# Patient Record
Sex: MALE | Race: OTHER | NOT HISPANIC OR LATINO
[De-identification: names, ages, dates, MRNs, and addresses within clinical notes are randomized per-mention and may not be internally consistent; named-entity substitution may affect disease eponyms.]

---

## 2021-08-20 PROBLEM — Z00.00 ENCOUNTER FOR PREVENTIVE HEALTH EXAMINATION: Status: ACTIVE | Noted: 2021-08-20

## 2021-08-27 ENCOUNTER — APPOINTMENT (OUTPATIENT)
Dept: OTOLARYNGOLOGY | Facility: CLINIC | Age: 61
End: 2021-08-27
Payer: COMMERCIAL

## 2021-08-27 ENCOUNTER — RESULT REVIEW (OUTPATIENT)
Age: 61
End: 2021-08-27

## 2021-08-27 VITALS
HEIGHT: 72 IN | SYSTOLIC BLOOD PRESSURE: 132 MMHG | BODY MASS INDEX: 22.35 KG/M2 | TEMPERATURE: 97.1 F | HEART RATE: 82 BPM | WEIGHT: 165 LBS | DIASTOLIC BLOOD PRESSURE: 74 MMHG

## 2021-08-27 DIAGNOSIS — Z86.39 PERSONAL HISTORY OF OTHER ENDOCRINE, NUTRITIONAL AND METABOLIC DISEASE: ICD-10-CM

## 2021-08-27 PROCEDURE — 92557 COMPREHENSIVE HEARING TEST: CPT

## 2021-08-27 PROCEDURE — 99204 OFFICE O/P NEW MOD 45 MIN: CPT | Mod: 25

## 2021-08-27 PROCEDURE — 31575 DIAGNOSTIC LARYNGOSCOPY: CPT

## 2021-08-27 PROCEDURE — 92550 TYMPANOMETRY & REFLEX THRESH: CPT

## 2021-08-27 RX ORDER — ALFUZOSIN HYDROCHLORIDE 10 MG/1
10 TABLET, EXTENDED RELEASE ORAL
Qty: 90 | Refills: 0 | Status: COMPLETED | COMMUNITY
Start: 2021-03-05

## 2021-08-27 NOTE — ASSESSMENT
[FreeTextEntry1] : Mr. MARTINEZ was evaluated for the following issues today:\par \par 1.) nasal congestion due to inferior turbinate hypertrophy mainly.  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  Has Mallampati 3 airway, with low floppy SP and relatively posterior tongue position due to small/narrow mandible\par --> sleep study (he prefers home), referral to SDI\par Options for treatment of CELINA and snoring discussed briefly - CPAP, mandibular advancement devices (MAD), sleep position.  Pharyngeal surgery not optimal based on his anatomy.\par \par 3.) Dysphagia pharyngoesophageal to solids only for about 1 year.  No throat pain or unintentional wt loss.\par R/o CP bar or other obstruction.  No evidence of reflux by hx or exam.\par --> modified barium swallow (pt instructed to take some solids that he has trouble swallowing with him to the test)\par \par 4.) HL and tinnitus bilateral.  Cerumen impactions removed bilateral \par Audiogram with LEFT within normal limits and RIGHT high frequency mild SNHL.\par Asymmetry of 5-15 dB, worse on right right side.  No reason evident for the asymmetry.\par --> retest audio in 3 months.  If same, will order MRI brain/IAC to r/o IAC lesion.\par \par Return in 1 month\par

## 2021-08-27 NOTE — DATA REVIEWED
[de-identified] : \par AUDIOGRAM (8/27/2021)\par RIGHT:  Hearing within normal limits through 2K Hz, sloping to mild SNHL\par LEFT:   Hearing within normal limits\par Asymmetry 1-8K Hz (10-20 dB), worse on right side.\par Tympanograms  A  bilateral  \par Word recognition 90% on right, 100% on  left\par \par

## 2021-08-27 NOTE — REVIEW OF SYSTEMS
[Patient Intake Form Reviewed] : Patient intake form was reviewed [As Noted in HPI] : as noted in HPI [Feeling Tired] : feeling tired [Negative] : Heme/Lymph

## 2021-08-27 NOTE — CONSULT LETTER
[Dear  ___] : Dear  [unfilled], [( Thank you for referring [unfilled] for consultation for _____ )] : Thank you for referring [unfilled] for consultation for [unfilled] [Please see my note below.] : Please see my note below. [Consult Closing:] : Thank you very much for allowing me to participate in the care of this patient.  If you have any questions, please do not hesitate to contact me. [Sincerely,] : Sincerely, [___] : [unfilled] [FreeTextEntry2] : Aureliano Castro MD\par [address]\[place] [address] [FreeTextEntry3] : \par Jaja Melo MD \par Otolaryngology, Head and Neck Surgery \par \par

## 2021-08-27 NOTE — HISTORY OF PRESENT ILLNESS
[de-identified] : Mr. MARTINEZ is a 61 year old man who was referred by Dr. Castro  for nasal blockage.\par \par 1.) At least 1 year of nasal congestion/blockage while awake but worse when lying down.  Congestion alternates sides.  When lying down, ipsilateral side of nose becomes completely blocked.\par No postnasal drip, rhinorrhea or facial pressure/pain.  Occasional temporal headaches.\par No hx nasal trauma or inhalant allergy.  \par No treatment for these sx.\par Does not feel rested after even 8 hrs sleep.  Snoring and witnessed apneas, plus nighttime awakenings, for several years.\par \par 2.) Trouble swallowing solids for about 1 year -- feels bolus gets stuck in lower throat and sometimes feels like choking; bolus clears with repeated drinking water.  \par No unintentional wt loss, throat pain, pain with swallowing, voice change, difficulty breathing.\par Never a smoker.  No alcohol.\par \par 3.) Hearing is gradually decreasing over past 1-2 years.  Wife complains that he doesn’t hear her.\par Occasional tinnitus nonpulsatile, R>L.\par No vertigo, ear pain, ear discharge.  No hx head injury, loud noise exposure or recurrent ear infections. \par Works as an .\par \par

## 2021-08-27 NOTE — PROCEDURE
[FreeTextEntry6] : \par \par Indication:  nasal congestion\par -Verbal consent was obtained from patient prior to exam. \par - Lewis-Synephrine spray applied to nose bilaterally.\par Nasal endoscopy was performed with flexible  scope.\par Findings: \par -- Inferior turbinates enlarged, L>R; slightly smaller after decongestant.  Inferior meatus clear bilateral.\par -- Septum was very mildly deviated to the right anterior \par -- No polyps either side nose\par -- Mucus clear bilateral\par -- Middle and superior turbinates normal bilateral\par -- Middle meatus clear bilateral.  SER clear bilateral.\par -- Nasopharynx without mass or exudate\par -- Adenoids were small\par -- Eustachian orifices were clear bilateral\par \par The patient tolerated the procedure well.\par   [de-identified] : \par Indication:  CELINA\par -Verbal consent was obtained from patient prior to procedure.\par -Lewis-Synephrine  spray applied to the nasal cavities.\par Flexible laryngoscopy was performed via left  nostril and revealed the following:\par   -- Nasopharynx had no mass or exudate.  Somewhat collapsing retropalatal space.\par   -- Base of tongue was symmetric and not enlarged.\par   -- Vallecula was clear\par   -- Epiglottis, both aryepiglottic folds and both false vocal folds were normal\par   -- Arytenoids both without edema and erythema \par   -- True vocal folds were fully mobile and without lesions. \par   -- Post cricoid area was clear.\par   -- Interarytenoid edema was absent \par   -- No lesions in laryngopharynx\par   -- Unable to seal for modified Henry maneuver\par  \par \par The patient tolerated the procedure well.\par

## 2021-08-27 NOTE — PHYSICAL EXAM
[Nasal Endoscopy Performed] : nasal endoscopy was performed, see procedure section for findings [] : septum deviated to the right [Midline] : trachea located in midline position [Normal] : no rashes [FreeTextEntry1] : No hoarseness.  [de-identified] : Bilateral cerumen impactions were removed with curette. [de-identified] : inferior turbinate hypertrophy L>R [de-identified] : Mallampati 3 airway [de-identified] : Very low soft palate position.  Narrow/small mandible. [de-identified] : Carotid pulses 2+ bilateral.

## 2021-10-06 ENCOUNTER — APPOINTMENT (OUTPATIENT)
Dept: RADIOLOGY | Facility: HOSPITAL | Age: 61
End: 2021-10-06
Payer: COMMERCIAL

## 2021-10-06 ENCOUNTER — OUTPATIENT (OUTPATIENT)
Dept: OUTPATIENT SERVICES | Facility: HOSPITAL | Age: 61
LOS: 1 days | End: 2021-10-06
Payer: COMMERCIAL

## 2021-10-06 PROCEDURE — 74230 X-RAY XM SWLNG FUNCJ C+: CPT | Mod: 26

## 2021-10-06 PROCEDURE — 92611 MOTION FLUOROSCOPY/SWALLOW: CPT | Mod: GN

## 2021-10-06 PROCEDURE — 74230 X-RAY XM SWLNG FUNCJ C+: CPT

## 2021-11-05 ENCOUNTER — APPOINTMENT (OUTPATIENT)
Dept: OTOLARYNGOLOGY | Facility: CLINIC | Age: 61
End: 2021-11-05

## 2021-11-29 ENCOUNTER — APPOINTMENT (OUTPATIENT)
Dept: SLEEP CENTER | Facility: HOME HEALTH | Age: 61
End: 2021-11-29
Payer: COMMERCIAL

## 2021-11-29 ENCOUNTER — OUTPATIENT (OUTPATIENT)
Dept: OUTPATIENT SERVICES | Facility: HOSPITAL | Age: 61
LOS: 1 days | End: 2021-11-29
Payer: COMMERCIAL

## 2021-11-29 PROCEDURE — 95800 SLP STDY UNATTENDED: CPT

## 2021-11-29 PROCEDURE — 95800 SLP STDY UNATTENDED: CPT | Mod: 26

## 2021-12-01 DIAGNOSIS — G47.33 OBSTRUCTIVE SLEEP APNEA (ADULT) (PEDIATRIC): ICD-10-CM

## 2022-01-13 ENCOUNTER — APPOINTMENT (OUTPATIENT)
Dept: OTOLARYNGOLOGY | Facility: CLINIC | Age: 62
End: 2022-01-13
Payer: COMMERCIAL

## 2022-01-13 DIAGNOSIS — J34.2 DEVIATED NASAL SEPTUM: ICD-10-CM

## 2022-01-13 PROCEDURE — 99214 OFFICE O/P EST MOD 30 MIN: CPT | Mod: 95

## 2022-01-13 NOTE — PHYSICAL EXAM
[Normal] : no masses and lesions seen, face is symmetric [FreeTextEntry1] : No hoarseness.  [de-identified] : No visible neck masses or asymmetry.

## 2022-01-13 NOTE — CONSULT LETTER
[Dear  ___] : Dear  [unfilled], [Please see my note below.] : Please see my note below. [Consult Closing:] : Thank you very much for allowing me to participate in the care of this patient.  If you have any questions, please do not hesitate to contact me. [Sincerely,] : Sincerely, [Courtesy Letter:] : I had the pleasure of seeing your patient, [unfilled], in my office today. [___] : [unfilled] [FreeTextEntry2] : Aureliano Castro MD\par [address]\[place] [address] [FreeTextEntry3] : \par Jaja Melo MD \par Otolaryngology, Head and Neck Surgery \par \par

## 2022-01-13 NOTE — DATA REVIEWED
[de-identified] : \par AUDIOGRAM (8/27/2021)\par RIGHT:  Hearing within normal limits through 2K Hz, sloping to mild SNHL\par LEFT:   Hearing within normal limits\par Asymmetry 1-8K Hz (10-20 dB), worse on right side.\par Tympanograms  A  bilateral  \par Word recognition 90% on right, 100% on  left\par \par

## 2022-01-13 NOTE — HISTORY OF PRESENT ILLNESS
[de-identified] : TELEHEALTH VISIT\par  Visit initiated at patient request. This audio / visual (using en[employer]) visit is occurring during the  state of emergency due to COVID-19. Governmental regulation is restricting travel, in-person contact, recommend use of remote activities and telemedicine whenever possible. I discussed with patient the limitations of telemedicine encounters, including risks associated with the technology platform, technical difficulties, data security, and a limited physical exam. There is also a limitation in performing diagnostic procedures and patient may need further testing and workup to arrive at a diagnosis and treatment plan. We discussed this will be billed as a visit. \par Mr. MARTINEZ understood and elected to proceed at 10 AM on Jan 13, 2022 \par Patient location: Office  in [place], [place].  Patient was the only participant.\par Physician location:  Office of Dr. Melo at [address] [place] [address] in [place], [place]\par ----------------------------------------------------------------------------------------\par ----------------------------------------------------------------------------------------\par  Mr. MARTINEZ is a 61 year old man who was seen in f/up for several ENT issues.\par \par 1.) Nasal breathing is slightly improved with fluticasone nasal spray but doesn’t take every day since started end of Aug 2021.  No nose bleed or dryness.\par Hx of about 1 year of nasal congestion/blockage (alternates sides) while awake but worse when lying down (ipsilateral side of nose becomes completely blocked).\par No postnasal drip, rhinorrhea or facial pressure/pain.  Occasional temporal headaches.\par No hx nasal trauma or inhalant allergy.   No treatment for these sx prior to steroid spray.\par \par 2.) Still does not feel rested after sleep (8 hrs).  Still reports that wife observes snoring, pause, apneas and nighttime awakenings for past few years.\par Home sleep study (11/29/2021) did not show sleep-disordered breathing.  RDI 10.9, lowest O2 sat 94%. Attended PSG was recommended if suspicion for CELINA is high.\par \par 3.) He had modified barium swallow to evaluate dysphagia.  \par Trouble swallowing solids for 1 year -- still feels bolus gets stuck in lower throat and sometimes feels like choking; bolus clears with repeated drinking water.  No unintentional wt loss, throat pain, pain with swallowing, voice change, difficulty breathing.  Never a smoker.  No alcohol.\par \par MODIFIED BARIUM SWALLOW (10/06/2021) at [facility] [place]:\par - ORAL phase:   within normal limits \par - PHARYNGEAL phase:  Swallow trigger timely.  Epiglottic and tongue base movements normal.  Mildly reduced hyolaryngeal excursion.  Deep laryngeal penetration x 1 during swallow as result of incompleta airway closure.  Penetrated material deepened after swallow, resulting in trace aspiration.  Aspirate was cleared w/ volitional cough.  Pharyngeal stripping wave present but incomplete, resulting in diffuse residue within vallecula, increased to trace-mild amount with purees.  UES opening complete, without obstruction of flow.\par Barium tablet passed through pharynx and esophagus.\par - ESOPHAGEAL phase:  Scan in A-P position revealed complete esophageal clearance\par IMPRESSION:  Mildly reduced pharyngeal swallow efficiency (puree > thins).  Mild residue to which he may be hypersensate or the residue in vallecula accumulates over the course of a meal if liquid washes are not provided.  Considered low risk for pulmonary complications related to the one incidence of aspiration of liquids and subsequent clearance with cough.\par RECOMMENDATIONS:  No change in diet regular with thins.  Take small bites and sips in slow manner.  Alternate every few bites of food with sips of liquid.  Remain upright during and for 30 minutes after meal.\par \par 4.) He will need repeat audiogram for mild ASHNL (worse on right side)\par Hearing is gradually decreasing over past 1-2 years.  Wife complains that he doesn’t hear her.\par Occasional tinnitus nonpulsatile, R>L.\par No vertigo, ear pain, ear discharge.  No hx head injury, loud noise exposure or recurrent ear infections. \par Works as an .\par \par

## 2022-01-13 NOTE — ASSESSMENT
[FreeTextEntry1] : Mr. MARTINEZ was evaluated for the following issues today:\par \par 1.) nasal congestion due to inferior turbinate hypertrophy bilateral, with no hx of allergy or sinusitis. On initial exam, there was minimal deviated nasal septum.  He has had mild improvement in breathing but does not use the steroid spray daily\par --> fluticasone nasal spray daily\par If no change in sx after 4 weeks, he would be good candidate for inferior turbinate reduction.  I reviewed the procedure with him for submucosal ablation, and he is interested.  Will reassess in next in-office visit.\par \par 2.) CELINA likely per history, although in-home study was negative for CELINA.  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  \par On modified barium swallow, no CP bar or other obstruction noted.  He had mildly reduced pharyngeal efficiency that should be addressed successfully with small bites, slower eating and alternating bites with liquid sips.\par \par 4.) HL and tinnitus bilateral. Audiogram (8/2021) with LEFT within normal limits and RIGHT high frequency mild SNHL.  Asymmetry of 5-15 dB, worse on right right side.  No reason evident for the asymmetry.\par --> retest audio at next in-office visit.\par \par Return after attended sleep study.\par

## 2023-02-01 ENCOUNTER — APPOINTMENT (OUTPATIENT)
Dept: OTOLARYNGOLOGY | Facility: CLINIC | Age: 63
End: 2023-02-01
Payer: COMMERCIAL

## 2023-02-01 VITALS
WEIGHT: 174.8 LBS | TEMPERATURE: 96.2 F | HEART RATE: 66 BPM | SYSTOLIC BLOOD PRESSURE: 125 MMHG | HEIGHT: 72 IN | BODY MASS INDEX: 23.68 KG/M2 | DIASTOLIC BLOOD PRESSURE: 77 MMHG

## 2023-02-01 DIAGNOSIS — K11.5 SIALOLITHIASIS: ICD-10-CM

## 2023-02-01 PROCEDURE — 92557 COMPREHENSIVE HEARING TEST: CPT

## 2023-02-01 PROCEDURE — 99214 OFFICE O/P EST MOD 30 MIN: CPT

## 2023-02-01 PROCEDURE — 92550 TYMPANOMETRY & REFLEX THRESH: CPT | Mod: 52

## 2023-06-27 NOTE — HISTORY OF PRESENT ILLNESS
[de-identified] : Mr. MARTINEZ presents for follow up.\par \par 1.) nasal congestion due to inferior turbinate hypertrophy bilateral is not improved. The congestions switches sides, especially at night.\par He has no improvement with fluticasone but has not tried it consistently for weeks. \par 2.) CELINA - He did not do the in lab sleep study yet. He will wait until the summer.\par Home sleep study 2011 with no significant sleep disordered breathing.\par 3.) Dysphagia pharyngoesophageal to solids for about 1 year. Swallowing is improved with taking small bites, slower eating and alternating bites with liquid sips. \par About 1 month ago when he swallowed food, he felt swelling behind his left jaw; resolved after less than 1 hour.\par 4.) HL and tinnitus bilateral. \par \par VISIT 01/13/2022\par  Mr. MARTINEZ is a 61 year old man who was seen in f/up for several ENT issues.\par 1.) Nasal breathing is slightly improved with fluticasone nasal spray but doesn’t take every day since started end of Aug 2021. No nose bleed or dryness.\par Hx of about 1 year of nasal congestion/blockage (alternates sides) while awake but worse when lying down (ipsilateral side of nose becomes completely blocked).\par No postnasal drip, rhinorrhea or facial pressure/pain. Occasional temporal headaches.\par No hx nasal trauma or inhalant allergy. No treatment for these sx prior to steroid spray.\par \par 2.) Still does not feel rested after sleep (8 hrs). Still reports that wife observes snoring, pause, apneas and nighttime awakenings for past few years.\par Home sleep study (11/29/2021) did not show sleep-disordered breathing. RDI 10.9, lowest O2 sat 94%. Attended PSG was recommended if suspicion for CELINA is high.\par \par 3.) He had modified barium swallow to evaluate dysphagia. \par Trouble swallowing solids for 1 year -- still feels bolus gets stuck in lower throat and sometimes feels like choking; bolus clears with repeated drinking water. No unintentional wt loss, throat pain, pain with swallowing, voice change, difficulty breathing. Never a smoker. No alcohol.\par \par MODIFIED BARIUM SWALLOW (10/06/2021) at [facility]:\par - ORAL phase: within normal limits \par - PHARYNGEAL phase: Swallow trigger timely. Epiglottic and tongue base movements normal. Mildly reduced hyolaryngeal excursion. Deep laryngeal penetration x 1 during swallow as result of incompleta airway closure. Penetrated material deepened after swallow, resulting in trace aspiration. Aspirate was cleared w/ volitional cough. Pharyngeal stripping wave present but incomplete, resulting in diffuse residue within vallecula, increased to trace-mild amount with purees. UES opening complete, without obstruction of flow.\par Barium tablet passed through pharynx and esophagus.\par - ESOPHAGEAL phase: Scan in A-P position revealed complete esophageal clearance\par IMPRESSION: Mildly reduced pharyngeal swallow efficiency (puree > thins). Mild residue to which he may be hypersensate or the residue in vallecula accumulates over the course of a meal if liquid washes are not provided. Considered low risk for pulmonary complications related to the one incidence of aspiration of liquids and subsequent clearance with cough.\par RECOMMENDATIONS: No change in diet regular with thins. Take small bites and sips in slow manner. Alternate every few bites of food with sips of liquid. Remain upright during and for 30 minutes after meal.\par \par 4.) He will need repeat audiogram for mild ASHNL (worse on right side)\par Hearing is gradually decreasing over past 1-2 years. Wife complains that he doesn’t hear her.\par Occasional tinnitus nonpulsatile, R>L.\par No vertigo, ear pain, ear discharge. No hx head injury, loud noise exposure or recurrent ear infections. \par Works as an .\par

## 2023-06-27 NOTE — DATA REVIEWED
[de-identified] : \par AUDIOGRAM (8/27/2021)\par RIGHT:  Hearing within normal limits through 2K Hz, sloping to mild SNHL\par LEFT:   Hearing within normal limits\par Asymmetry 1-8K Hz (10-20 dB), worse on right side.\par Tympanograms  A  bilateral  \par Word recognition 90% on right, 100% on  left\par \par

## 2023-06-27 NOTE — ASSESSMENT
[FreeTextEntry1] : Mr. MARTINEZ was evaluated for the following issues today: \par \par 1.) nasal congestion due to inferior turbinate hypertrophy bilateral is not improved.\par --> continue fluticasone nasal spray x 3 weeks.  Take DAILY.\par If doesn’t improved, consider inferior turbinate reduction and maybe septoplasty\par \par 2.) CELINA likely per history, although in-home study was negative for CELINA in 2011. 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  Stone may pass.  NO sx now.\par --> increase hydration\par \par 6.) Dysphagia resolved with change in eating habits\par \par Return after tax season

## 2023-06-27 NOTE — PHYSICAL EXAM
[Normal] : mucosa is normal [Midline] : trachea located in midline position [de-identified] : bilateral inferior turbinate hypertrophy  [FreeTextEntry1] : No hoarseness.  [de-identified] : Left submandibular gland slightly enlarged, NT. [de-identified] : Thyroid gland normal size, no palpable nodules.  [de-identified] : bilateral inferior turbinate hypertrophy  [de-identified] : Palpable small stone on midline floor of mouth  [de-identified] : See NECK.  Right submandibular and bilateral parotid glands were normal.

## 2023-07-24 ENCOUNTER — NON-APPOINTMENT (OUTPATIENT)
Age: 63
End: 2023-07-24

## 2023-09-20 ENCOUNTER — APPOINTMENT (OUTPATIENT)
Dept: OTOLARYNGOLOGY | Facility: CLINIC | Age: 63
End: 2023-09-20
Payer: COMMERCIAL

## 2023-09-20 VITALS
SYSTOLIC BLOOD PRESSURE: 134 MMHG | HEIGHT: 72 IN | TEMPERATURE: 97.6 F | BODY MASS INDEX: 23.16 KG/M2 | WEIGHT: 171 LBS | DIASTOLIC BLOOD PRESSURE: 79 MMHG | HEART RATE: 65 BPM

## 2023-09-20 DIAGNOSIS — H93.13 TINNITUS, BILATERAL: ICD-10-CM

## 2023-09-20 DIAGNOSIS — K21.9 GASTRO-ESOPHAGEAL REFLUX DISEASE W/OUT ESOPHAGITIS: ICD-10-CM

## 2023-09-20 DIAGNOSIS — J34.3 HYPERTROPHY OF NASAL TURBINATES: ICD-10-CM

## 2023-09-20 DIAGNOSIS — N40.0 BENIGN PROSTATIC HYPERPLASIA WITHOUT LOWER URINARY TRACT SYMPMS: ICD-10-CM

## 2023-09-20 PROCEDURE — 99214 OFFICE O/P EST MOD 30 MIN: CPT | Mod: 25

## 2023-09-20 PROCEDURE — 31575 DIAGNOSTIC LARYNGOSCOPY: CPT

## 2023-09-20 RX ORDER — TAMSULOSIN HYDROCHLORIDE 0.4 MG/1
0.4 CAPSULE ORAL
Refills: 0 | Status: ACTIVE | COMMUNITY

## 2023-09-20 RX ORDER — FLUTICASONE PROPIONATE 50 UG/1
50 SPRAY, METERED NASAL DAILY
Qty: 1 | Refills: 4 | Status: COMPLETED | COMMUNITY
Start: 2021-08-27 | End: 2023-09-20

## 2023-10-01 PROBLEM — H93.13 TINNITUS, BILATERAL: Status: ACTIVE | Noted: 2023-10-01

## 2023-10-01 PROBLEM — J34.3 HYPERTROPHY OF BOTH INFERIOR NASAL TURBINATES: Status: ACTIVE | Noted: 2021-08-27

## 2023-12-21 ENCOUNTER — APPOINTMENT (OUTPATIENT)
Dept: OTOLARYNGOLOGY | Facility: CLINIC | Age: 63
End: 2023-12-21
Payer: COMMERCIAL

## 2023-12-21 ENCOUNTER — RESULT REVIEW (OUTPATIENT)
Age: 63
End: 2023-12-21

## 2023-12-21 VITALS
HEART RATE: 78 BPM | SYSTOLIC BLOOD PRESSURE: 115 MMHG | WEIGHT: 175 LBS | HEIGHT: 72 IN | DIASTOLIC BLOOD PRESSURE: 74 MMHG | BODY MASS INDEX: 23.7 KG/M2 | TEMPERATURE: 96.9 F

## 2023-12-21 DIAGNOSIS — G47.33 OBSTRUCTIVE SLEEP APNEA (ADULT) (PEDIATRIC): ICD-10-CM

## 2023-12-21 DIAGNOSIS — H93.A2 PULSATILE TINNITUS, LEFT EAR: ICD-10-CM

## 2023-12-21 DIAGNOSIS — H90.41 SENSORINEURAL HEARING LOSS, UNILATERAL, RIGHT EAR, WITH UNRESTRICTED HEARING ON THE CONTRALATERAL SIDE: ICD-10-CM

## 2023-12-21 DIAGNOSIS — R13.13 DYSPHAGIA, PHARYNGEAL PHASE: ICD-10-CM

## 2023-12-21 DIAGNOSIS — H93.232 HYPERACUSIS, LEFT EAR: ICD-10-CM

## 2023-12-21 DIAGNOSIS — R09.89 OTHER SPECIFIED SYMPTOMS AND SIGNS INVOLVING THE CIRCULATORY AND RESPIRATORY SYSTEMS: ICD-10-CM

## 2023-12-21 PROCEDURE — 99214 OFFICE O/P EST MOD 30 MIN: CPT | Mod: 25

## 2023-12-21 PROCEDURE — 31575 DIAGNOSTIC LARYNGOSCOPY: CPT

## 2023-12-21 RX ORDER — FLUTICASONE PROPIONATE 50 UG/1
50 SPRAY, METERED NASAL DAILY
Qty: 1 | Refills: 3 | Status: COMPLETED | COMMUNITY
Start: 2023-09-20 | End: 2023-12-21

## 2023-12-28 ENCOUNTER — RX RENEWAL (OUTPATIENT)
Age: 63
End: 2023-12-28

## 2024-01-24 ENCOUNTER — APPOINTMENT (OUTPATIENT)
Dept: GASTROENTEROLOGY | Facility: CLINIC | Age: 64
End: 2024-01-24
Payer: COMMERCIAL

## 2024-01-24 VITALS
WEIGHT: 171 LBS | SYSTOLIC BLOOD PRESSURE: 128 MMHG | BODY MASS INDEX: 23.16 KG/M2 | DIASTOLIC BLOOD PRESSURE: 80 MMHG | HEIGHT: 72 IN | OXYGEN SATURATION: 98 % | RESPIRATION RATE: 14 BRPM | HEART RATE: 75 BPM | TEMPERATURE: 96.4 F

## 2024-01-24 DIAGNOSIS — K21.00 GASTRO-ESOPHAGEAL REFLUX DISEASE WITH ESOPHAGITIS, WITHOUT BLEEDING: ICD-10-CM

## 2024-01-24 DIAGNOSIS — R13.14 DYSPHAGIA, PHARYNGOESOPHAGEAL PHASE: ICD-10-CM

## 2024-01-24 PROCEDURE — 99204 OFFICE O/P NEW MOD 45 MIN: CPT

## 2024-01-24 RX ORDER — FAMOTIDINE 20 MG/1
20 TABLET, FILM COATED ORAL
Qty: 30 | Refills: 5 | Status: COMPLETED | COMMUNITY
Start: 2023-09-20 | End: 2024-01-24

## 2024-01-24 NOTE — ASSESSMENT
[FreeTextEntry1] : 63M with a h/o dysphagia, frequent throat clearing, and occasional heartburn who presents for these symptoms.   #Intermittent heartburn #Dysphagia - continue omeprazole once daily - schedule for EGD at [facility] for further eval - f/u MBS with SLP next month   Heath Kerr MD Gastroenterology

## 2024-01-24 NOTE — PHYSICAL EXAM
[Alert] : alert [No Acute Distress] : no acute distress [Sclera] : the sclera and conjunctiva were normal [Hearing Threshold Finger Rub Not [place]] : hearing was normal [No Respiratory Distress] : no respiratory distress [No Acc Muscle Use] : no accessory muscle use [Respiration, Rhythm And Depth] : normal respiratory rhythm and effort [Heart Rate And Rhythm] : heart rate was normal and rhythm regular [Abdomen Tenderness] : non-tender [Abdomen Soft] : soft [] : no rash [No Focal Deficits] : no focal deficits [Oriented To Time, Place, And Person] : oriented to person, place, and time

## 2024-01-24 NOTE — HISTORY OF PRESENT ILLNESS
[FreeTextEntry1] : 63M with a h/o dysphagia, frequent throat clearing, and occasional heartburn who presents for these symptoms. He endorses these symptoms have been present for the past 2-3 years. He thinks they are not necessarily worse, but just persistent. He denies nausea and vomiting. He does not endorse true heartburn symptoms regularly but does occasionally have them based on dietary indiscretions. His dysphagia occurs mostly in the upper throat, but still has some in his upper chest as well. Does have intermittent constipation.   Never had EGD. Had colonoscopy in 2017, and he was told it was wnl and to rpt in 10 years.   Family Hx: no GI malignancy, CD, UC   Social Hx: remote smoking for a year during college, 1 glass of wine 2-3x weekly, no recreational drugs, works as an

## 2024-02-22 ENCOUNTER — APPOINTMENT (OUTPATIENT)
Dept: RADIOLOGY | Facility: HOSPITAL | Age: 64
End: 2024-02-22
Payer: COMMERCIAL

## 2024-02-22 ENCOUNTER — OUTPATIENT (OUTPATIENT)
Dept: OUTPATIENT SERVICES | Facility: HOSPITAL | Age: 64
LOS: 1 days | End: 2024-02-22
Payer: COMMERCIAL

## 2024-02-22 PROCEDURE — 74230 X-RAY XM SWLNG FUNCJ C+: CPT | Mod: 26

## 2024-02-22 PROCEDURE — 74230 X-RAY XM SWLNG FUNCJ C+: CPT

## 2024-02-22 PROCEDURE — 92611 MOTION FLUOROSCOPY/SWALLOW: CPT | Mod: GN

## 2024-04-16 ENCOUNTER — RX RENEWAL (OUTPATIENT)
Age: 64
End: 2024-04-16

## 2024-04-16 RX ORDER — OMEPRAZOLE 20 MG/1
20 CAPSULE, DELAYED RELEASE ORAL DAILY
Qty: 30 | Refills: 2 | Status: ACTIVE | COMMUNITY
Start: 2023-09-20 | End: 1900-01-01

## 2024-04-24 ENCOUNTER — APPOINTMENT (OUTPATIENT)
Dept: OTOLARYNGOLOGY | Facility: CLINIC | Age: 64
End: 2024-04-24

## 2024-04-29 PROBLEM — R13.13 PHARYNGEAL DYSPHAGIA: Status: ACTIVE | Noted: 2024-04-29

## 2024-04-29 NOTE — HISTORY OF PRESENT ILLNESS
[de-identified] : Mr. MARTINEZ reports that throat clearing and slimy saliva production still occurs, despite PPI and H2 blocker.  No longer awakens to clear throat.  Heartburn occasionally. He reports that food and pills getting stuck in upper chest and in throat is getting worse.  MBS within normal limits in 2021. Left pulsatile tinnitus heard during night sometimes.  Has hyperacusis on left when answers his cell phone.  ASNHL, worse on left side.  MRI IAC with and without contrast negative in 6/2023. Nose get dry and has to be cleaned over past 1-2 months. CELINA sx persist.  He has not had time to schedule sleep study.  VISIT 9/20/2023 Mr. MARTINEZ c/o throat clearing worse over the past month.  Brings up slimy saliva when awake and during sleep.  Doesnt feel postnasal drip.  No heartburn.  Had choking occasionally if eats quickly; no problems if eats slowly and drinks during meal.  No throat pain, difficulty breathing, or voice change. Able to breathe through nose.  Mucus is drying up in his nose. Still needs to in-lab sleep study to assess for CELINA. Still hears echo sounds in both ears at times.  Known Right ASNHL and bilateral tinnitus.  MRI IAC+ was negative.  VISIT 02/01/2023 1.) nasal congestion due to inferior turbinate hypertrophy bilateral is not improved. The congestions switches sides, especially at night.  He has no improvement with fluticasone but has not tried it consistently for weeks. 2.) CELINA - He did not do the in-lab sleep study yet. He will wait until the summer. Home sleep study 2011 with no significant sleep disordered breathing. 3.) Dysphagia pharyngoesophageal to solids for about 1 year. Swallowing is improved with taking small bites, slower eating and alternating bites with liquid sips. About 1 month ago when he swallowed food, he felt swelling behind his left jaw; resolved after less than 1 hour. 4.) HL and tinnitus bilateral.  VISIT 01/13/2022 Mr. MARTINEZ is a 61 year old man who was seen in f/up for several ENT issues. 1.) Nasal breathing is slightly improved with fluticasone nasal spray but doesn't take every day since started end of Aug 2021. No nosebleed or dryness. Hx of about 1 year of nasal congestion/blockage (alternates sides) while awake but worse when lying down (ipsilateral side of nose becomes completely blocked). No postnasal drip, rhinorrhea or facial pressure/pain. Occasional temporal headaches. No hx nasal trauma or inhalant allergy. No treatment for these sx prior to steroid spray. 2.) Still does not feel rested after sleep (8 hrs). Still reports that wife observes snoring, pause, apneas and nighttime awakenings for past few years. Home sleep study (11/29/2021) did not show sleep-disordered breathing. RDI 10.9, lowest O2 sat 94%. Attended PSG was recommended if suspicion for CELINA is high. 3.) He had modified barium swallow to evaluate dysphagia. Trouble swallowing solids for 1 year -- still feels bolus gets stuck in lower throat and sometimes feels like choking; bolus clears with repeated drinking water. No unintentional wt loss, throat pain, pain with swallowing, voice change, difficulty breathing. Never a smoker. No alcohol. 4.) He will need repeat audiogram for mild ASHNL (worse on right side) Hearing is gradually decreasing over past 1-2 years. Wife complains that he doesn't hear her. Occasional tinnitus nonpulsatile, R>L. No vertigo, ear pain, ear discharge. No hx head injury, loud noise exposure or recurrent ear infections. Works as an .   MODIFIED BARIUM SWALLOW (10/06/2021) at [facility]: - ORAL phase: within normal limits - PHARYNGEAL phase: Swallow trigger timely. Epiglottic and tongue base movements normal. Mildly reduced hyolaryngeal excursion. Deep laryngeal penetration x 1 during swallow as result of incompleta airway closure. Penetrated material deepened after swallow, resulting in trace aspiration. Aspirate was cleared w/ volitional cough. Pharyngeal stripping wave present but incomplete, resulting in diffuse residue within vallecula, increased to trace-mild amount with purees. UES opening complete, without obstruction of flow. Barium tablet passed through pharynx and esophagus. - ESOPHAGEAL phase: Scan in A-P position revealed complete esophageal clearance IMPRESSION:  Mildly reduced pharyngeal swallow efficiency (puree > thins). Mild residue to which he may be hypersensate or the residue in vallecula accumulates over the course of a meal if liquid washes are not provided. Considered low risk for pulmonary complications related to the one incidence of aspiration of liquids and subsequent clearance with cough. RECOMMENDATIONS:  No change in diet regular with thins. Take small bites and sips in slow manner. Alternate every few bites of food with sips of liquid. Remain upright during and for 30 minutes after meal.

## 2024-04-29 NOTE — PROCEDURE
[de-identified] : LARYNGOSCOPY EXAM:  Indication:  Reflux -Verbal consent was obtained from patient prior to procedure. Flexible laryngoscopy was performed via right nostril and revealed the following:   -- Nasopharynx had no mass or exudate.   -- Base of tongue was symmetric and not enlarged.   -- Vallecula was clear   -- Epiglottis, both aryepiglottic folds and both false vocal folds were normal   -- Arytenoids both with moderate edema and minimal erythema    -- True vocal folds were fully mobile and without lesions.    -- Post cricoid area was clear.   -- Interarytenoid edema was present.   -- No lesions seen in laryngopharynx The patient tolerated the procedure well.

## 2024-04-29 NOTE — PHYSICAL EXAM
[Midline] : trachea located in midline position [Laryngoscopy Performed] : laryngoscopy was performed, see procedure section for findings [Normal] : no neck adenopathy [FreeTextEntry1] : No hoarseness.  [de-identified] : bilateral inferior turbinate hypertrophy  [de-identified] : slightly dry bilateral  [de-identified] : Frothy secretions in oropharynx.

## 2024-04-29 NOTE — ASSESSMENT
[FreeTextEntry1] : Mr. MARTINEZ was evaluated for the following issues today:  1.) chronic throat clearing, slimy saliva due to GERD -- GI evaluation - continue current meds  2.) Dysphagia ia worse.  MBS was normal in 2021. - modified barium swallow   3.) Dry nose --> saline gel  4.)  Hyperacusis and tinnitus pulsatile, left side -  MRA head/neck discussed but he does not want right now.  Return after above testing.

## 2024-04-29 NOTE — DATA REVIEWED
[de-identified] : \par  AUDIOGRAM (8/27/2021)\par  RIGHT:  Hearing within normal limits through 2K Hz, sloping to mild SNHL\par  LEFT:   Hearing within normal limits\par  Asymmetry 1-8K Hz (10-20 dB), worse on right side.\par  Tympanograms  A  bilateral  \par  Word recognition 90% on right, 100% on  left\par  \par

## 2024-05-14 ENCOUNTER — APPOINTMENT (OUTPATIENT)
Age: 64
End: 2024-05-14
Payer: COMMERCIAL

## 2024-05-14 ENCOUNTER — RESULT REVIEW (OUTPATIENT)
Age: 64
End: 2024-05-14

## 2024-05-14 PROCEDURE — 43239 EGD BIOPSY SINGLE/MULTIPLE: CPT

## 2024-05-30 ENCOUNTER — NON-APPOINTMENT (OUTPATIENT)
Age: 64
End: 2024-05-30

## 2024-07-26 ENCOUNTER — RX RENEWAL (OUTPATIENT)
Age: 64
End: 2024-07-26